# Patient Record
Sex: FEMALE | Race: WHITE | NOT HISPANIC OR LATINO | Employment: UNEMPLOYED | URBAN - METROPOLITAN AREA
[De-identification: names, ages, dates, MRNs, and addresses within clinical notes are randomized per-mention and may not be internally consistent; named-entity substitution may affect disease eponyms.]

---

## 2022-02-11 ENCOUNTER — APPOINTMENT (EMERGENCY)
Dept: RADIOLOGY | Facility: HOSPITAL | Age: 27
End: 2022-02-11
Payer: COMMERCIAL

## 2022-02-11 ENCOUNTER — HOSPITAL ENCOUNTER (EMERGENCY)
Facility: HOSPITAL | Age: 27
Discharge: HOME | End: 2022-02-11
Attending: EMERGENCY MEDICINE | Admitting: EMERGENCY MEDICINE
Payer: COMMERCIAL

## 2022-02-11 VITALS
DIASTOLIC BLOOD PRESSURE: 70 MMHG | RESPIRATION RATE: 18 BRPM | SYSTOLIC BLOOD PRESSURE: 130 MMHG | OXYGEN SATURATION: 99 % | HEART RATE: 81 BPM | WEIGHT: 145 LBS | TEMPERATURE: 98.2 F

## 2022-02-11 DIAGNOSIS — S06.0X9A CONCUSSION: ICD-10-CM
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
## 2022-02-11 DIAGNOSIS — S09.90XA INJURY OF HEAD, INITIAL ENCOUNTER: ICD-10-CM

## 2022-02-11 PROCEDURE — 70450 CT HEAD/BRAIN W/O DYE: CPT

## 2022-02-11 PROCEDURE — G1004 CDSM NDSC: HCPCS

## 2022-02-11 PROCEDURE — 99284 EMERGENCY DEPT VISIT MOD MDM: CPT | Performed by: EMERGENCY MEDICINE

## 2022-02-11 PROCEDURE — 99283 EMERGENCY DEPT VISIT LOW MDM: CPT

## 2022-02-11 RX ORDER — IBUPROFEN 400 MG/1
400 TABLET ORAL ONCE
Status: DISCONTINUED | OUTPATIENT
Start: 2022-02-11 | End: 2022-02-11

## 2022-02-11 RX ORDER — ACETAMINOPHEN 325 MG/1
650 TABLET ORAL ONCE
Status: DISCONTINUED | OUTPATIENT
Start: 2022-02-11 | End: 2022-02-11

## 2022-02-11 NOTE — ED PROVIDER NOTES
History  Chief Complaint   Patient presents with    Fall     snowboarding fell hit head and had visual changes x 15 mins + dizzy + nausea      26-year-old female patient with history of anxiety presenting with fall onset 2 hours ago  Patient was snowboarding and was on a box when she fell backwards and fell down and hit her head  Patient was wearing helmet  Denies LOC or blood thinners  Patient states that she had white spots in her vision after fall for 15 minutes  Patient is now complaining frontal headache, lightheadedness, nausea  Denies vomiting, neck pain or any other injuries  None       History reviewed  No pertinent past medical history  History reviewed  No pertinent surgical history  History reviewed  No pertinent family history  I have reviewed and agree with the history as documented  E-Cigarette/Vaping     E-Cigarette/Vaping Substances     Social History     Tobacco Use    Smoking status: Not on file    Smokeless tobacco: Not on file   Substance Use Topics    Alcohol use: Not on file    Drug use: Not on file        Review of Systems   Constitutional: Negative for chills, fatigue and fever  HENT: Negative for congestion, rhinorrhea and sore throat  Eyes: Positive for pain  Negative for visual disturbance  Respiratory: Negative for cough, chest tightness and shortness of breath  Cardiovascular: Negative for chest pain and leg swelling  Gastrointestinal: Positive for nausea  Negative for abdominal distention, abdominal pain, diarrhea and vomiting  Genitourinary: Negative for difficulty urinating and dysuria  Musculoskeletal: Negative for arthralgias, back pain, myalgias and neck pain  Skin: Negative for rash and wound  Neurological: Positive for light-headedness and headaches  Negative for dizziness and weakness  All other systems reviewed and are negative        Physical Exam  ED Triage Vitals [02/11/22 1216]   Temperature Pulse Respirations Blood Pressure SpO2   98 2 °F (36 8 °C) 81 18 130/70 99 %      Temp Source Heart Rate Source Patient Position - Orthostatic VS BP Location FiO2 (%)   Oral Monitor Sitting Right arm --      Pain Score       7             Orthostatic Vital Signs  Vitals:    02/11/22 1216   BP: 130/70   Pulse: 81   Patient Position - Orthostatic VS: Sitting       Physical Exam  Vitals reviewed  Constitutional:       Appearance: Normal appearance  HENT:      Head: Normocephalic and atraumatic  Nose: Nose normal       Mouth/Throat:      Mouth: Mucous membranes are moist       Pharynx: Oropharynx is clear  Eyes:      Extraocular Movements: Extraocular movements intact  Conjunctiva/sclera: Conjunctivae normal       Pupils: Pupils are equal, round, and reactive to light  Cardiovascular:      Rate and Rhythm: Normal rate and regular rhythm  Pulses: Normal pulses  Heart sounds: Normal heart sounds  Pulmonary:      Effort: Pulmonary effort is normal       Breath sounds: Normal breath sounds  Abdominal:      General: Bowel sounds are normal       Palpations: Abdomen is soft  Tenderness: There is no abdominal tenderness  Musculoskeletal:         General: Normal range of motion  Cervical back: Normal range of motion  No rigidity or tenderness  Skin:     General: Skin is warm and dry  Neurological:      General: No focal deficit present  Mental Status: She is alert and oriented to person, place, and time  Mental status is at baseline  Cranial Nerves: No cranial nerve deficit  Sensory: No sensory deficit  Motor: No weakness  Coordination: Coordination normal       Gait: Gait normal          ED Medications  Medications - No data to display    Diagnostic Studies  Results Reviewed     None                 CT head without contrast   Final Result by Kecia Rivera MD (02/11 9832)      No acute intracranial abnormality                    Workstation performed: [id number] Procedures  Procedures      ED Course                                       MDM  Number of Diagnoses or Management Options  Concussion  Fall, initial encounter  Injury of head, initial encounter  Diagnosis management comments: 32 y o  patient with hx of anxiety presenting with head injury s/p fall from snowboarding  Patient was on a box while snowboarding and fell backwards  Denies LOC, blood thinners, vomiting  Patient has headache, nausea, lightheadedness  No other injuries  Exam shows normal neuro exam  Imaging CT head requested by patient negative for acute process  Stable for discharge with follow up with PCP and concussion center  Return precautions given  Amount and/or Complexity of Data Reviewed  Tests in the radiology section of CPT®: ordered and reviewed        Disposition  Final diagnoses:   Fall, initial encounter   Injury of head, initial encounter   Concussion     Time reflects when diagnosis was documented in both MDM as applicable and the Disposition within this note     Time User Action Codes Description Comment    2/11/2022  1:22 PM Elvin Babb Add [Q91  CQMF] Fall, initial encounter     2/11/2022  1:23 PM [place], [address] [C63 92IP] Injury of head, initial encounter     2/11/2022  1:23 PM [facility] [F10 [id number][facility] Concussion       ED Disposition     ED Disposition Condition Date/Time Comment    Discharge Stable Fri Feb 11, 2022  1:22 PM [address] discharge to home/self care  Follow-up Information     Follow up With Specialties Details Why Contact Info Additional [address]  [facility] Schedule an appointment as soon as possible for a visit   [address] [place] [phone]  [place] [address], [address]          There are no discharge medications for this patient          PDMP Review None           ED Provider  Attending physically available and evaluated Latonia Arroyo  I managed the patient along with the ED Attending      Electronically Signed by         Juliann Rayo MD  02/11/22 8768

## 2022-02-11 NOTE — DISCHARGE INSTRUCTIONS
- If you / family member develop any of the following, please return to the Emergency Department for re-evaluation and possible repeat imaging:  Inability to awaken patient at time of expected awakening; you don't have to purposely awaken the patient every hour though  Severe/worsening headache  Somnolence / confusion / excessive sleepiness   Restless, unsteadiness  Seizures  Difficulties with vision  Vomiting, fever, stiff neck  Incontinence of poop or urine  New weakness or numbness anywhere    The most important part of concussion is to avoid the next one  No contact sports until you're cleared by your family doctor  This includes not just play time but also practice  There used to be older recommendations about concussions that you can't do anything that involve thinking  It's okay to return to activities that you feel you can tolerate after a day  Longer periods of rest haven't been shown to be beneficial and in fact might prolong concussive symptoms  The majority of symptoms of a concussion for most people last the first 7-10 days  Rarely does it go beyond 1 month

## 2022-02-11 NOTE — Clinical Note
Katieevens Chaudharys was seen and treated in our emergency department on 2/11/2022  Diagnosis: concussion    Sandie Moore  may return to work on return date  She may return on this date: 02/19/2022         If you have any questions or concerns, please don't hesitate to call        Juliann Rayo MD    ______________________________           _______________          _______________  [facility] Representative                              Date                                Time

## 2022-02-11 NOTE — Clinical Note
Arthur Turner was seen and treated in our emergency department on 2/11/2022  Diagnosis: concussion    Josefa Mcwilliams  may return to work on return date  She may return on this date: 02/19/2022         If you have any questions or concerns, please don't hesitate to call        Ga Prince MD    ______________________________           _______________          _______________  [facility] Representative                              Date                                Time

## 2022-02-14 NOTE — ED ATTENDING ATTESTATION
2/11/2022  IRene MD, saw and evaluated the patient  I have discussed the patient with the resident/non-physician practitioner and agree with the resident's/non-physician practitioner's findings, Plan of Care, and MDM as documented in the resident's/non-physician practitioner's note, except where noted  All available labs and Radiology studies were reviewed  I was present for key portions of any procedure(s) performed by the resident/non-physician practitioner and I was immediately available to provide assistance  At this point I agree with the current assessment done in the Emergency Department  I have conducted an independent evaluation of this patient a history and physical is as follows:    ED Course    69-year-old female status post fall with head strike on snowboarding park patient was helmeted struck the back of her head positive loss of consciousness did states she had some vision changes  Denies any numbness weakness focal neurologic deficit this time  Denies any other injuries at this time  Vital signs reviewed    Head normocephalic atraumatic TMs clear bilaterally no nystagmus  Neck no C-spine tenderness to palpation normal range of motion neck  Neuro, cranial nerves grossly intact strength 5/5 bilaterally normal speech normal gait no limb or truncal ataxia  Impression closed head injury differential diagnosis includes intracranial hemorrhage skull fracture concussion  Patient has a reassuring neurologic examination at this time  CT imaging reviewed no acute intracranial abnormality  Suspect patient's symptoms are likely due to concussion    Patient advised follow-up with primary care provider for re-evaluation symptoms rest NSAIDs as needed for pain return precautions given      Critical Care Time  Procedures